# Patient Record
Sex: MALE | Race: OTHER | NOT HISPANIC OR LATINO
[De-identification: names, ages, dates, MRNs, and addresses within clinical notes are randomized per-mention and may not be internally consistent; named-entity substitution may affect disease eponyms.]

---

## 2017-04-04 PROBLEM — Z00.00 ENCOUNTER FOR PREVENTIVE HEALTH EXAMINATION: Status: ACTIVE | Noted: 2017-04-04

## 2017-04-21 ENCOUNTER — RECORD ABSTRACTING (OUTPATIENT)
Age: 27
End: 2017-04-21

## 2017-04-28 ENCOUNTER — APPOINTMENT (OUTPATIENT)
Dept: UROLOGY | Facility: CLINIC | Age: 27
End: 2017-04-28

## 2017-04-28 VITALS — HEART RATE: 59 BPM | DIASTOLIC BLOOD PRESSURE: 60 MMHG | SYSTOLIC BLOOD PRESSURE: 95 MMHG

## 2017-04-28 DIAGNOSIS — Z78.9 OTHER SPECIFIED HEALTH STATUS: ICD-10-CM

## 2017-05-05 ENCOUNTER — MEDICATION RENEWAL (OUTPATIENT)
Age: 27
End: 2017-05-05

## 2017-05-24 ENCOUNTER — MEDICATION RENEWAL (OUTPATIENT)
Age: 27
End: 2017-05-24

## 2017-06-23 ENCOUNTER — APPOINTMENT (OUTPATIENT)
Dept: UROLOGY | Facility: CLINIC | Age: 27
End: 2017-06-23

## 2017-06-23 VITALS — HEART RATE: 46 BPM | SYSTOLIC BLOOD PRESSURE: 102 MMHG | DIASTOLIC BLOOD PRESSURE: 56 MMHG | HEIGHT: 64 IN

## 2017-06-23 RX ORDER — CLOMIPHENE CITRATE 50 MG/1
50 TABLET ORAL
Qty: 15 | Refills: 1 | Status: DISCONTINUED | COMMUNITY
Start: 2017-05-24 | End: 2017-06-23

## 2017-08-02 ENCOUNTER — APPOINTMENT (OUTPATIENT)
Dept: UROLOGY | Facility: CLINIC | Age: 27
End: 2017-08-02
Payer: MEDICAID

## 2017-08-02 VITALS — HEART RATE: 56 BPM | DIASTOLIC BLOOD PRESSURE: 55 MMHG | SYSTOLIC BLOOD PRESSURE: 96 MMHG

## 2017-08-02 PROCEDURE — 99213 OFFICE O/P EST LOW 20 MIN: CPT

## 2017-09-21 ENCOUNTER — OTHER (OUTPATIENT)
Age: 27
End: 2017-09-21

## 2017-09-25 ENCOUNTER — APPOINTMENT (OUTPATIENT)
Dept: UROLOGY | Facility: CLINIC | Age: 27
End: 2017-09-25
Payer: MEDICAID

## 2017-09-25 VITALS
HEIGHT: 64 IN | WEIGHT: 115 LBS | HEART RATE: 56 BPM | BODY MASS INDEX: 19.63 KG/M2 | SYSTOLIC BLOOD PRESSURE: 101 MMHG | DIASTOLIC BLOOD PRESSURE: 56 MMHG

## 2017-09-25 PROCEDURE — 99213 OFFICE O/P EST LOW 20 MIN: CPT

## 2017-09-25 NOTE — ASSESSMENT
[FreeTextEntry1] : There are no major changes but there are slight ones and at this point this is all we are allowed to do and therefore this is what he will have to stay with until either she gets pregnant or they decide they will live with what they have. If and when they reach that point he’ll have to decide whether to stop the medication which is the most likely recommendation as he does not need it for testosterone and if the not trying to have spontaneous conception he’s not going to need elevated levels of FSH.\par \par Whether raising the FSH with recombinant drug such as Gonal-F or Follistim would help further is not something I can say and it is far from inexpensive.\par \par As well they can try IUI without or with O-H

## 2017-09-25 NOTE — HISTORY OF PRESENT ILLNESS
[None] : no symptoms [FreeTextEntry1] : Mr. Iglesias has been on the Clomid at a half a tablet twice per week, Mucomyst 600 mg per day and is here to see the results these RVs his blood tests and semen analysis. He’s had no major change in symptoms are function unfortunately as of yet she is not yet pregnant.

## 2017-09-25 NOTE — LETTER BODY
[Dear  ___] : Dear  [unfilled], [Attached please find my note.] : Attached please find my note. [Thank you very much for allowing me to participate in the care of this patient. If you have any questions, please do not hesitate to contact me] : Thank you very much for allowing me to participate in the care of this patient. If you have any questions, please do not hesitate to contact me.

## 2017-09-25 NOTE — PHYSICAL EXAM
[General Appearance - Well Developed] : well developed [General Appearance - Well Nourished] : well nourished [Normal Appearance] : normal appearance [Well Groomed] : well groomed [General Appearance - In No Acute Distress] : no acute distress [Abdomen Soft] : soft [Abdomen Tenderness] : non-tender [Costovertebral Angle Tenderness] : no ~M costovertebral angle tenderness [Heart Rate And Rhythm] : Heart rate and rhythm were normal [Edema] : no peripheral edema [] : no respiratory distress [Respiration, Rhythm And Depth] : normal respiratory rhythm and effort [Exaggerated Use Of Accessory Muscles For Inspiration] : no accessory muscle use [Auscultation Breath Sounds / Voice Sounds] : lungs were clear to auscultation bilaterally [Oriented To Time, Place, And Person] : oriented to person, place, and time [Affect] : the affect was normal [Mood] : the mood was normal [Not Anxious] : not anxious [Normal Station and Gait] : the gait and station were normal for the patient's age

## 2017-09-25 NOTE — LETTER HEADER
[Yolanda Robles MD] : Yolanda Robles MD [Director of Urology] : Director of Urology [Director of Male Infertility] : Director of Male Infertility [[address] : [address] [[address]] : [address] [[place] [address]] : [place] [address] [Tel [phone]] : Tel [phone] [Fax [phone]] : Fax [phone]

## 2017-10-02 ENCOUNTER — RX RENEWAL (OUTPATIENT)
Age: 27
End: 2017-10-02

## 2017-10-27 ENCOUNTER — RX RENEWAL (OUTPATIENT)
Age: 27
End: 2017-10-27

## 2017-10-30 ENCOUNTER — RX RENEWAL (OUTPATIENT)
Age: 27
End: 2017-10-30

## 2017-12-11 ENCOUNTER — APPOINTMENT (OUTPATIENT)
Dept: UROLOGY | Facility: CLINIC | Age: 27
End: 2017-12-11
Payer: MEDICAID

## 2017-12-11 VITALS — HEART RATE: 46 BPM | DIASTOLIC BLOOD PRESSURE: 61 MMHG | SYSTOLIC BLOOD PRESSURE: 105 MMHG

## 2017-12-11 VITALS — BODY MASS INDEX: 19.63 KG/M2 | HEIGHT: 64 IN | WEIGHT: 115 LBS

## 2017-12-11 PROCEDURE — 99213 OFFICE O/P EST LOW 20 MIN: CPT

## 2017-12-11 NOTE — ADDENDUM
[FreeTextEntry1] : They were in [place] and had an ultrasound done there on May 16 by a Dr. Chowdhury. According to his note ultrasonographically he has a grade 1 right and a great to left varicocele something it is not concur with my exam. I will be sending this report to a colleague of mine in [facility] and reviewed that with him. If he feels that this is a skilled center I will have the test repeated here and consider sending him for a Venogram if this is confirmed.

## 2017-12-11 NOTE — LETTER BODY
[FreeTextEntry2] : Pietro Serrano MD\par [address] [place] [address] [address]\par [place] [address]\par  [Dear  ___] : Dear  [unfilled], [Attached please find my note.] : Attached please find my note. [Thank you very much for allowing me to participate in the care of this patient. If you have any questions, please do not hesitate to contact me] : Thank you very much for allowing me to participate in the care of this patient. If you have any questions, please do not hesitate to contact me.

## 2017-12-11 NOTE — PHYSICAL EXAM
[General Appearance - Well Developed] : well developed [General Appearance - Well Nourished] : well nourished [Normal Appearance] : normal appearance [Well Groomed] : well groomed [General Appearance - In No Acute Distress] : no acute distress [Abdomen Soft] : soft [Abdomen Tenderness] : non-tender [Costovertebral Angle Tenderness] : no ~M costovertebral angle tenderness [Heart Rate And Rhythm] : Heart rate and rhythm were normal [Edema] : no peripheral edema [] : no respiratory distress [Respiration, Rhythm And Depth] : normal respiratory rhythm and effort [Exaggerated Use Of Accessory Muscles For Inspiration] : no accessory muscle use [Auscultation Breath Sounds / Voice Sounds] : lungs were clear to auscultation bilaterally [Oriented To Time, Place, And Person] : oriented to person, place, and time [Affect] : the affect was normal [Mood] : the mood was normal [Not Anxious] : not anxious [Normal Station and Gait] : the gait and station were normal for the patient's age [No Focal Deficits] : no focal deficits

## 2017-12-11 NOTE — HISTORY OF PRESENT ILLNESS
[FreeTextEntry1] : Vipul has been on Clomid one dose or the other for over six months. His FSH has been in the normal range however when he was on a full dose of Clomid the estradiol went too high. We slowly cut back on the estradiol adding in recombinant FSH and he had repeat bloods drawn on November 28 and is here for follow-up. His current dose of Clomid is a half a tablet twice a week and his current dose of recombinant FSH is 75 international units or three times per week. Please note because of the hyper viscosity he’s been on an herbal supplement of Mucomyst.

## 2017-12-11 NOTE — ASSESSMENT
[FreeTextEntry1] : The exam shows no changes, the bloods are as good as I’m going to get them and she is not yet pregnant. The next step would be to retest the sperm either with a post vaginal sample or [facility] planning on using it for an IUI. If the sample is not as good as it had been when I saw him back in April then they’ll contact me though not sure what else to result I can do. If it’s good enough for IUI then they’ll try that possibly with possibly without hyper ovulation. If the sample is not good enough for IUI than though have to decide if they going to go ahead with IVF.

## 2017-12-11 NOTE — LETTER HEADER
[Yolanda Robles MD] : Yolanda Robles MD [Director of Urology] : Director of Urology [Director of Male Infertility] : Director of Male Infertility [[address] : [address] [[address]] : [address] [[place] [address]] : [place] [address] [Tel [phone]] : Tel [phone] [Fax [phone]] : Fax [phone]

## 2018-01-11 ENCOUNTER — RX RENEWAL (OUTPATIENT)
Age: 28
End: 2018-01-11

## 2018-02-09 ENCOUNTER — APPOINTMENT (OUTPATIENT)
Dept: UROLOGY | Facility: CLINIC | Age: 28
End: 2018-02-09
Payer: MEDICAID

## 2018-02-09 VITALS
HEIGHT: 64 IN | HEART RATE: 58 BPM | DIASTOLIC BLOOD PRESSURE: 65 MMHG | WEIGHT: 115 LBS | TEMPERATURE: 97.8 F | OXYGEN SATURATION: 96 % | BODY MASS INDEX: 19.63 KG/M2 | SYSTOLIC BLOOD PRESSURE: 109 MMHG

## 2018-02-09 PROCEDURE — 99214 OFFICE O/P EST MOD 30 MIN: CPT

## 2018-03-19 ENCOUNTER — APPOINTMENT (OUTPATIENT)
Dept: UROLOGY | Facility: CLINIC | Age: 28
End: 2018-03-19
Payer: MEDICAID

## 2018-03-19 VITALS
DIASTOLIC BLOOD PRESSURE: 51 MMHG | HEART RATE: 53 BPM | BODY MASS INDEX: 19.63 KG/M2 | SYSTOLIC BLOOD PRESSURE: 93 MMHG | WEIGHT: 115 LBS | HEIGHT: 64 IN

## 2018-03-19 PROCEDURE — 99213 OFFICE O/P EST LOW 20 MIN: CPT

## 2018-03-19 NOTE — HISTORY OF PRESENT ILLNESS
[FreeTextEntry1] : Mr. Iglesias is been followed for quite some time. He had a varicocele ligation a pass. We been trying him with Clomid and a combination of Gonal-F as straight Clomid made his estradiol too high. He had an ultrasound done in [place] which I finally got one of my colleagues in [place] to review when he was not impressed the family still questions why the other doctors said what he said and they are more than welcome to go back to him what repeat the ultrasound here. In the meantime they went to Dr. Corrales for second opinion he also felt there was no recurrence of the varicocele in fact he felt there was no need for the Gonal-F at all and just recommended assisted reproduction. There  is still wants him to try for a least one more cycle and in fact wants the Gonal-F increased. His semen analysis done on January 3 was borderline within normal limits, please see the results section and the question is what to do. [None] : no symptoms

## 2018-03-19 NOTE — ASSESSMENT
[FreeTextEntry1] : His exam today shows no signs of changes in shows no gynecomastia.\par \par The Rabbi wanted to double the Gonal-F it’s only an issue of money. I will order it for him are but I strongly recommend at this point that they go to assisted reproduction. Whether that would be IUI or IVF is between them and the reproductive endocrinologist. From my viewpoint I’ll have him get blood in 2 ½ and see me in three months.\par

## 2018-03-19 NOTE — PHYSICAL EXAM
[General Appearance - Well Developed] : well developed [General Appearance - Well Nourished] : well nourished [Normal Appearance] : normal appearance [Well Groomed] : well groomed [General Appearance - In No Acute Distress] : no acute distress [Abdomen Soft] : soft [Abdomen Tenderness] : non-tender [Costovertebral Angle Tenderness] : no ~M costovertebral angle tenderness [Scrotum] : the scrotum was normal [Heart Rate And Rhythm] : Heart rate and rhythm were normal [Edema] : no peripheral edema [] : no respiratory distress [Respiration, Rhythm And Depth] : normal respiratory rhythm and effort [Exaggerated Use Of Accessory Muscles For Inspiration] : no accessory muscle use [Auscultation Breath Sounds / Voice Sounds] : lungs were clear to auscultation bilaterally [Oriented To Time, Place, And Person] : oriented to person, place, and time [Affect] : the affect was normal [Mood] : the mood was normal [Not Anxious] : not anxious [Normal Station and Gait] : the gait and station were normal for the patient's age [No Focal Deficits] : no focal deficits [FreeTextEntry1] : Fields of View were normal

## 2018-03-19 NOTE — LETTER BODY
[FreeTextEntry2] : Pietro Serarno MD\par [address] [place] [address] [address]\par [place] [address] [Dear  ___] : Dear  [unfilled], [Attached please find my note.] : Attached please find my note. [Thank you very much for allowing me to participate in the care of this patient. If you have any questions, please do not hesitate to contact me] : Thank you very much for allowing me to participate in the care of this patient. If you have any questions, please do not hesitate to contact me.

## 2018-03-19 NOTE — LETTER HEADER
[Yolanda Robles MD] : Yolanda Robles MD [Director of Urology] : Director of Urology [Director of Male Infertility] : Director of Male Infertility [[address] : [address] [[address]] [address] [[place] [address]] : [place] [address] [Tel [phone]] : Tel [phone] [Fax [phone]] : Fax [phone]

## 2018-06-27 ENCOUNTER — APPOINTMENT (OUTPATIENT)
Dept: UROLOGY | Facility: CLINIC | Age: 28
End: 2018-06-27

## 2018-07-27 PROBLEM — Z78.9 ALCOHOL USE: Status: ACTIVE | Noted: 2017-04-28

## 2018-08-08 ENCOUNTER — APPOINTMENT (OUTPATIENT)
Dept: UROLOGY | Facility: CLINIC | Age: 28
End: 2018-08-08
Payer: MEDICAID

## 2018-08-08 PROCEDURE — 99213 OFFICE O/P EST LOW 20 MIN: CPT

## 2018-08-08 NOTE — PHYSICAL EXAM
[General Appearance - Well Developed] : well developed [General Appearance - Well Nourished] : well nourished [Normal Appearance] : normal appearance [Well Groomed] : well groomed [General Appearance - In No Acute Distress] : no acute distress [Abdomen Soft] : soft [Abdomen Tenderness] : non-tender [Costovertebral Angle Tenderness] : no ~M costovertebral angle tenderness [Heart Rate And Rhythm] : Heart rate and rhythm were normal [Edema] : no peripheral edema [] : no respiratory distress [Respiration, Rhythm And Depth] : normal respiratory rhythm and effort [Exaggerated Use Of Accessory Muscles For Inspiration] : no accessory muscle use [Auscultation Breath Sounds / Voice Sounds] : lungs were clear to auscultation bilaterally [Oriented To Time, Place, And Person] : oriented to person, place, and time [Affect] : the affect was normal [Mood] : the mood was normal [Not Anxious] : not anxious [Normal Station and Gait] : the gait and station were normal for the patient's age [No Focal Deficits] : no focal deficits [FreeTextEntry1] : Fields of View were normal

## 2018-08-08 NOTE — LETTER BODY
[Dear  ___] : Dear  [unfilled], [Courtesy Letter:] : I had the pleasure of seeing your patient, [unfilled], in my office today. [Please see my note below.] : Please see my note below. [Sincerely,] : Sincerely, [FreeTextEntry2] : Pietro Serrano MD\par [address] [place] [address] [address]\par [place] [address]\par

## 2018-08-08 NOTE — ASSESSMENT
[FreeTextEntry1] : His exam is benign, there is no signs of fluid retention but I’m not comfortable with such a high estradiol. At this point I am recommending they proceed to IVF. Dr. Serrano was willing to try one more IUI but with such low Jeff criteria having failed three times and with all the time they spent going through this I think it’s time to move on. They will speak to the Rabbi and if he wishes to the Rabbi will call me.

## 2018-08-08 NOTE — LETTER HEADER
[FreeTextEntry3] : Yolanda Robles M.D.\par Director of Urology\par [facility]\par [address] [place] [address], [address]\[place] [address]

## 2018-08-08 NOTE — HISTORY OF PRESENT ILLNESS
[FreeTextEntry1] : Vipul came in with his wife for follow-up with his treatment for sub fertility and pituitary insufficiency with Clomid and Gonal-F. He has been very sensitive to Clomid with low doses raising his testosterone. He’s been taking the Gonal-F to raise his sperm count and because of hyper viscous sperm he’s been on N- Acetyl Cysteine\par He had bloods drawn on August 1 and semen analysis done in April and June 2018. He reportedly had another one in July but I don’t have that one\par He is here for an exam and review.\par  [None] : no symptoms

## 2019-01-28 ENCOUNTER — RX RENEWAL (OUTPATIENT)
Age: 29
End: 2019-01-28

## 2019-02-06 ENCOUNTER — APPOINTMENT (OUTPATIENT)
Dept: UROLOGY | Facility: CLINIC | Age: 29
End: 2019-02-06
Payer: MEDICAID

## 2019-02-06 VITALS
BODY MASS INDEX: 19.63 KG/M2 | WEIGHT: 115 LBS | HEART RATE: 58 BPM | HEIGHT: 64 IN | DIASTOLIC BLOOD PRESSURE: 41 MMHG | SYSTOLIC BLOOD PRESSURE: 83 MMHG

## 2019-02-06 DIAGNOSIS — E34.9 ENDOCRINE DISORDER, UNSPECIFIED: ICD-10-CM

## 2019-02-06 DIAGNOSIS — I86.1 SCROTAL VARICES: ICD-10-CM

## 2019-02-06 DIAGNOSIS — E23.7 DISORDER OF PITUITARY GLAND, UNSPECIFIED: ICD-10-CM

## 2019-02-06 DIAGNOSIS — N46.9 MALE INFERTILITY, UNSPECIFIED: ICD-10-CM

## 2019-02-06 PROCEDURE — 99213 OFFICE O/P EST LOW 20 MIN: CPT

## 2019-02-06 RX ORDER — FOLLITROPIN 900 [IU]/1.5ML
900 INJECTION, SOLUTION SUBCUTANEOUS
Qty: 2 | Refills: 5 | Status: ACTIVE | COMMUNITY
Start: 2017-10-02 | End: 1900-01-01

## 2019-02-06 NOTE — LETTER BODY
[Dear  ___] : Dear  [unfilled], [Courtesy Letter:] : I had the pleasure of seeing your patient, [unfilled], in my office today. [Please see my note below.] : Please see my note below. [Sincerely,] : Sincerely, [FreeTextEntry2] : Pietro Serrano MD\par [address] [place] [address] [address]\par [place] [address]

## 2019-02-06 NOTE — HISTORY OF PRESENT ILLNESS
[None] : None [FreeTextEntry1] : Vipul is a 28-year-old male who we have been following for treatment of subfertility and pituitary insufficiency. Currently, he is managed with Clomid 25 mg once per week and Gonal-F injections. He did have bloods drawn in late January, however they did not run testosterone labs. They are going to the lab this week for repeat draw.

## 2019-02-06 NOTE — ASSESSMENT
[FreeTextEntry1] : His physical examination is within normal limits. His semen analysis shows improved parameters. At this point, I am recommending they proceed to IVF. There is nothing else that I can do that I think would improve his fertility potential. He is extremely sensitive to Clomid and is on a very very low dose but even that amount is helping him somewhat. Unfortunately the amount we need to get his FSH up makes his testosterone and estradiol way too high so he needs to take Gonal-F which is not inexpensive He will continue Clomid and Gonal-F and we will review his blood work when we obtain it later this week. They understand he must remain on these medications until they have a viable embryos. The question as to whether or not to stop and then restart is something I can answer but medically I would prefer he stay on it. I do not know if he stops and then restarts if his system will start up again. If they get enough embryos that they have all the children they will ever need that I see no reason not to stop it and if something got from it happens they can hope again hope that the system will restart if they restart.

## 2019-02-06 NOTE — LETTER HEADER
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 Please check to see if he has any respiratory symptoms of coughing or fevers or chills. If he does not have the symptoms, please have him go to the radiology department to do a chest x-ray. If he does have respiratory symptoms, he needs to schedule to see me for appointment for follow-up. AKILAI to [facility]. [FreeTextEntry3] : Yolanda Robles M.D.\par Director of Urology\par [facility]\par [address] [place] [address], [address]\[place] [address]

## 2019-02-06 NOTE — PHYSICAL EXAM
[General Appearance - Well Developed] : well developed [General Appearance - Well Nourished] : well nourished [Normal Appearance] : normal appearance [Well Groomed] : well groomed [General Appearance - In No Acute Distress] : no acute distress [Bowel Sounds] : normal bowel sounds [Abdomen Soft] : soft [Abdomen Tenderness] : non-tender [Costovertebral Angle Tenderness] : no ~M costovertebral angle tenderness [Edema] : no peripheral edema [] : no respiratory distress [Respiration, Rhythm And Depth] : normal respiratory rhythm and effort [Exaggerated Use Of Accessory Muscles For Inspiration] : no accessory muscle use [Oriented To Time, Place, And Person] : oriented to person, place, and time [Affect] : the affect was normal [Mood] : the mood was normal [Not Anxious] : not anxious [Normal Station and Gait] : the gait and station were normal for the patient's age [No Focal Deficits] : no focal deficits [FreeTextEntry1] : Visual fields intact

## 2019-04-30 RX ORDER — CLOMIPHENE CITRATE 50 MG/1
50 TABLET ORAL
Qty: 5 | Refills: 1 | Status: ACTIVE | COMMUNITY
Start: 2017-06-23 | End: 1900-01-01

## 2019-07-24 ENCOUNTER — APPOINTMENT (OUTPATIENT)
Dept: UROLOGY | Facility: CLINIC | Age: 29
End: 2019-07-24